# Patient Record
Sex: FEMALE | Race: WHITE | NOT HISPANIC OR LATINO | URBAN - METROPOLITAN AREA
[De-identification: names, ages, dates, MRNs, and addresses within clinical notes are randomized per-mention and may not be internally consistent; named-entity substitution may affect disease eponyms.]

---
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
## 2022-03-13 NOTE — ED PROVIDER NOTE - BIRTH SEX
Called to check on patient.  Left message for her to call clinic with questions or concerns  
Female
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
## 2022-10-10 NOTE — ED PROCEDURE NOTE - CPROC ED COMPLICATIONS1
no

## 2022-10-12 ENCOUNTER — EMERGENCY (EMERGENCY)
Facility: HOSPITAL | Age: 22
LOS: 0 days | Discharge: HOME | End: 2022-10-12
Attending: PEDIATRICS | Admitting: PEDIATRICS

## 2022-10-12 VITALS
RESPIRATION RATE: 18 BRPM | TEMPERATURE: 98 F | DIASTOLIC BLOOD PRESSURE: 84 MMHG | WEIGHT: 293 LBS | OXYGEN SATURATION: 100 % | HEART RATE: 96 BPM | SYSTOLIC BLOOD PRESSURE: 142 MMHG

## 2022-10-12 DIAGNOSIS — T47.96XA: ICD-10-CM
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
## 2022-10-12 DIAGNOSIS — K92.1 MELENA: ICD-10-CM

## 2022-10-12 DIAGNOSIS — Z87.19 PERSONAL HISTORY OF OTHER DISEASES OF THE DIGESTIVE SYSTEM: ICD-10-CM

## 2022-10-12 DIAGNOSIS — Z91.14 PATIENT'S OTHER NONCOMPLIANCE WITH MEDICATION REGIMEN: ICD-10-CM
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
## 2022-10-12 PROCEDURE — 99282 EMERGENCY DEPT VISIT SF MDM: CPT

## 2022-10-12 NOTE — ED PROVIDER NOTE - NS ED ROS FT
Constitutional: (-) fever (-)chills  (-)sweats  Eyes/ENT: (-) blurry vision (-) epistaxis  (-)rhinorrhea  (-)sore throat  Cardiovascular: (-) chest pain, (-) palpitations   Respiratory: (-) cough, (-) shortness of breath  Gastrointestinal: (-) vomiting, (-) diarrhea  (-) abdominal pain (+) bloody stool  Musculoskeletal: (-) neck pain, (-) back pain, (-) joint pain  Integumentary: (-) rash, (-) edema  Neurological: (-) headache, (-) altered mental status  (-) LOC  Allergic/Immunologic: (-) pruritus

## 2022-10-12 NOTE — ED PROVIDER NOTE - NSFOLLOWUPINSTRUCTIONS_ED_ALL_ED_FT
Contact a health care provider if:  •You have a fever.  •Your diarrhea gets worse.  •You have new symptoms.  •You cannot keep fluids down.  •You feel light-headed or dizzy.  •You have a headache.  •You have muscle cramps.        Get help right away if:  •You have chest pain.  •You feel extremely weak or you faint.  •The blood in your diarrhea increases or turns a different color.  •You vomit and the vomit is bloody or looks black.  •You have persistent diarrhea.  •You have severe pain, cramping, or bloating in your abdomen.  •You have trouble breathing or you are breathing very quickly.  •Your heart is beating very quickly.  •Your skin feels cold and clammy.  •You feel confused.  •You have signs of dehydration, such as:  •Dark urine, very little urine, or no urine.  •Cracked lips.  •Dry mouth.  •Sunken eyes.  •Sleepiness.  •Weakness.

## 2022-10-12 NOTE — ED PEDIATRIC NURSE NOTE - OBJECTIVE STATEMENT
pt states had 1 emesi of blood in stool states stool is brown snake like texture. pt has hx of abnormal endoscorpy denies vomiting states "always has abdomen pain"

## 2022-10-12 NOTE — ED PROVIDER NOTE - ATTENDING CONTRIBUTION TO CARE
23 yo F presents with bloody stools x 1 episode. Reports the blood was bright red when wiping and in the toilet. Denies any difficulty with having a bowel movement. Has a hx of ulcers so was concerned. Has not follow up with GI and non compliant with her meds. No vomiting or abdominal pain. No cp or sob. No lightheadedness or dizziness. Able to tolerate PO. VS reviewed pt well appearing nad  obese female heent eomi perrl no conjunctival injection TM wnl pharynx no erythema or exudates no cervical LAD cvs rrr s1 s2 no murmurs lungs ctabl abd soft nt nd no guarding no HSM rectal no gross blood per rectum no fissures nontender exam  ext from x 4 skin no rash wwp cap refil <2 neuro exam grossly normal  A: Bloody stool P: Reassurance, supportive caree. Outpt GI follow up. Return precautions discussed.

## 2022-10-12 NOTE — ED PROVIDER NOTE - PHYSICAL EXAMINATION
PHYSICAL EXAM:  General:	                   In no acute distress  Respiratory:	Lungs CTA b/l. Effort even and unlabored.  CV:		RRR. Normal S1/S2. No murmurs  Abdomen:	Soft, non-distended. Bowel sounds present. Non-tender to palpation throughout  Skin:		No rash.  Extremities:	Warm and well perfused.   Neurologic:	Alert and oriented.

## 2022-10-12 NOTE — ED PEDIATRIC TRIAGE NOTE - BP NONINVASIVE SYSTOLIC (MM HG)
142 General Surgery Office Consultation / H & P 
 
CC: Abdominal pain History of Present Illness:  
  
Lucia Mas is a 29 y.o. female who presents with chronic abdominal pain. Patient reports that over the last few years she has had severe right upper quadrant pain. This pain is usually associated with fatty foods including pork, steak, and pizza. The pain can be sharp in nature with radiation to her back. Can be 8 out of 10. Eating worsens the pain. Associated nausea as well pain medication and laxation helps. She been seen by Dr. Karyn Thrasher with GI to identify the etiology of her pain. She underwent a HIDA scan last year showing a hyper contractile gallbladder. Ultrasound done with GI showed no stones apparently. He had an EGD showing gastritis but no ulcers per patient. Only on birth control. Patient reports a strong family history of all females on her side of the family having her gallbladder out. Also history of no stones with the relatives and also HIDA scan showing hypercontractile gallbladder. Patient here for consideration of lap dutch. Past Medical History:  
Diagnosis Date  Anxiety  Asthma  Depression Past Surgical History:  
Procedure Laterality Date  HX TONSILLECTOMY  HX WISDOM TEETH EXTRACTION Family History Problem Relation Age of Onset  Prostate Cancer Father Social History Socioeconomic History  Marital status:  Spouse name: Not on file  Number of children: Not on file  Years of education: Not on file  Highest education level: Not on file Tobacco Use  Smoking status: Never Smoker  Smokeless tobacco: Never Used Substance and Sexual Activity  Alcohol use: Yes Comment: socially  Drug use: No  
 Sexual activity: Yes  
  Partners: Male Birth control/protection: Pill Prior to Admission medications Medication Sig Start Date End Date Taking?  Authorizing Provider  
norethindrone-ethinyl estradiol (Junel 1/20, 21,) 1-20 mg-mcg tablet Take 1 Tab by mouth daily. Skips placebo pills 8/19/47  Yes Deshawn Palacios MD  
b complex vitamins tablet Take 1 Tab by mouth daily. Provider, Historical  
 
No Known Allergies Review of Systems: 
Constitutional: No fever or chills Neurologic: No headache Eyes: No scleral icterus or irritated eyes Nose: No nasal pain or drainage Mouth: No oral lesions or sore throat Cardiac: No palpations or chest pain Pulmonary: No cough or shortness or breath Gastrointestinal: Abdominal pain and nausea Genitourinary: No dysuria Musculoskeletal: No muscle or joint tenderness Skin: No rashes or lesions Psychiatric: No anxiety or depressed mood Physical Exam:  
 
Visit Vitals /83 Pulse 98 Temp 98.8 °F (37.1 °C) (Oral) Resp 16 Ht 5' 4\" (1.626 m) Wt 117 lb 6.4 oz (53.3 kg) SpO2 98% BMI 20.15 kg/m² General: No acute distress, conversant Eyes: PERRLA, no scleral icterus HENT: Normocephalic without oral lesions Neck: Trachea midline without LAD Cardiac: Normal pulse rate and rhythm Pulmonary: Symmetric chest rise with normal effort GI: Soft, NT, ND, no hernia, no splenomegaly Skin: Warm without rash Extremities: No edema or joint stiffness Psych: Appropriate mood and affect Assessment:  
 
60-year-old female with a history of abdominal pain with a HIDA scan showing hypercontractile gallbladder with a strong family history of the same issue that responded well to lap dutch. Plan:  
 
Definitely an atypical presentation. Her story and the strong family history makes me suspicious of a hypercontractile gallbladder syndrome like biliary colic. I will try to obtain the ultrasound from the GI office to ensure findings. I have ordered labs to be done today including a CBC and CMP. I will discuss the case with my partners regarding appropriate next steps.   Multiple case reports and articles regarding hypercontractile gallbladder responding well to lap dutch. This is usually indicated when the EF is greater than 80. Her EF is 81. I discussed with the patient that I believe removing her gallbladder would likely solve her complaints. No other etiology has been found. No food intolerance found on GI work-up. I discussed with the patient the risks and benefits of surgery which include bleeding, infection, bile duct injury, cystic duct leak, bowel injury, the risk of anesthesia which includes MI, stroke, death. The patient understands these risks and given her family history and how miserable she is, along with her HIDA scan, she would like to proceed with lap dutch in the near future. I have discussed the case with my , Dr. Adrianne Greenberg, who agrees that lap dutch is appropriate for this patient and likely will solve her problem as well. Total time involved with this patient's care was: 45 minutes. This involved reviewing patient record, talking with patient, and charting on patient. Signed By: Leanna Bean MD 
Bariatric and General Surgeon [address] [place] [address] [facility] April 9, 2021

## 2022-10-12 NOTE — ED PROVIDER NOTE - PATIENT PORTAL LINK FT
You can access the FollowMyHealth Patient Portal offered by [facility] by registering at the following website: [email or web address]. By joining [employer]’s FollowMyHealth portal, you will also be able to view your health information using other applications (apps) compatible with our system.

## 2022-10-12 NOTE — ED PROVIDER NOTE - CARE PROVIDER_API CALL
Tea Gee (MD)  Gastroenterology  [address] [place] [address]  Phone: [phone]  Fax: [phone]  Follow Up Time: Routine

## 2022-12-23 ENCOUNTER — EMERGENCY (EMERGENCY)
Facility: HOSPITAL | Age: 22
LOS: 0 days | Discharge: HOME | End: 2022-12-24
Attending: STUDENT IN AN ORGANIZED HEALTH CARE EDUCATION/TRAINING PROGRAM | Admitting: STUDENT IN AN ORGANIZED HEALTH CARE EDUCATION/TRAINING PROGRAM

## 2022-12-23 VITALS
SYSTOLIC BLOOD PRESSURE: 152 MMHG | TEMPERATURE: 100 F | RESPIRATION RATE: 22 BRPM | HEART RATE: 103 BPM | WEIGHT: 293 LBS | DIASTOLIC BLOOD PRESSURE: 66 MMHG | OXYGEN SATURATION: 96 %

## 2022-12-23 DIAGNOSIS — Z28.311 PARTIALLY VACCINATED FOR COVID-19: ICD-10-CM

## 2022-12-23 DIAGNOSIS — R50.9 FEVER, UNSPECIFIED: ICD-10-CM

## 2022-12-23 DIAGNOSIS — M79.18 MYALGIA, OTHER SITE: ICD-10-CM

## 2022-12-23 DIAGNOSIS — R11.2 NAUSEA WITH VOMITING, UNSPECIFIED: ICD-10-CM

## 2022-12-23 DIAGNOSIS — U07.1 COVID-19: ICD-10-CM

## 2022-12-23 PROCEDURE — 99285 EMERGENCY DEPT VISIT HI MDM: CPT

## 2022-12-23 RX ORDER — ONDANSETRON 8 MG/1
4 TABLET, FILM COATED ORAL ONCE
Refills: 0 | Status: COMPLETED | OUTPATIENT
Start: 2022-12-23 | End: 2022-12-23

## 2022-12-23 RX ORDER — SODIUM CHLORIDE 9 MG/ML
1000 INJECTION INTRAMUSCULAR; INTRAVENOUS; SUBCUTANEOUS ONCE
Refills: 0 | Status: COMPLETED | OUTPATIENT
Start: 2022-12-23 | End: 2022-12-23

## 2022-12-24 VITALS
RESPIRATION RATE: 20 BRPM | HEART RATE: 91 BPM | SYSTOLIC BLOOD PRESSURE: 147 MMHG | OXYGEN SATURATION: 96 % | TEMPERATURE: 100 F | DIASTOLIC BLOOD PRESSURE: 64 MMHG

## 2022-12-24 PROBLEM — K21.9 GASTRO-ESOPHAGEAL REFLUX DISEASE WITHOUT ESOPHAGITIS: Chronic | Status: ACTIVE | Noted: 2022-10-12

## 2022-12-24 LAB
ALBUMIN SERPL ELPH-MCNC: 4.2 G/DL — SIGNIFICANT CHANGE UP (ref 3.5–5.2)
ALP SERPL-CCNC: 85 U/L — SIGNIFICANT CHANGE UP (ref 30–115)
ALT FLD-CCNC: 16 U/L — SIGNIFICANT CHANGE UP (ref 14–37)
ANION GAP SERPL CALC-SCNC: 11 MMOL/L — SIGNIFICANT CHANGE UP (ref 7–14)
APPEARANCE UR: CLEAR — SIGNIFICANT CHANGE UP
AST SERPL-CCNC: 16 U/L — SIGNIFICANT CHANGE UP (ref 14–37)
BASOPHILS # BLD AUTO: 0.04 K/UL — SIGNIFICANT CHANGE UP (ref 0–0.2)
BASOPHILS NFR BLD AUTO: 0.4 % — SIGNIFICANT CHANGE UP (ref 0–1)
BILIRUB SERPL-MCNC: 0.2 MG/DL — SIGNIFICANT CHANGE UP (ref 0.2–1.2)
BILIRUB UR-MCNC: NEGATIVE — SIGNIFICANT CHANGE UP
BUN SERPL-MCNC: 14 MG/DL — SIGNIFICANT CHANGE UP (ref 10–20)
CALCIUM SERPL-MCNC: 9.2 MG/DL — SIGNIFICANT CHANGE UP (ref 8.4–10.5)
CHLORIDE SERPL-SCNC: 101 MMOL/L — SIGNIFICANT CHANGE UP (ref 98–110)
CO2 SERPL-SCNC: 22 MMOL/L — SIGNIFICANT CHANGE UP (ref 17–32)
COLOR SPEC: YELLOW — SIGNIFICANT CHANGE UP
CREAT SERPL-MCNC: 0.9 MG/DL — SIGNIFICANT CHANGE UP (ref 0.7–1.5)
DIFF PNL FLD: ABNORMAL
EGFR: 94 ML/MIN/1.73M2 — SIGNIFICANT CHANGE UP
EOSINOPHIL # BLD AUTO: 0.03 K/UL — SIGNIFICANT CHANGE UP (ref 0–0.7)
EOSINOPHIL NFR BLD AUTO: 0.3 % — SIGNIFICANT CHANGE UP (ref 0–8)
EPI CELLS # UR: ABNORMAL /HPF
FLUAV AG NPH QL: SIGNIFICANT CHANGE UP
FLUBV AG NPH QL: SIGNIFICANT CHANGE UP
GLUCOSE SERPL-MCNC: 105 MG/DL — HIGH (ref 70–99)
GLUCOSE UR QL: NEGATIVE MG/DL — SIGNIFICANT CHANGE UP
HCG SERPL QL: NEGATIVE — SIGNIFICANT CHANGE UP
HCT VFR BLD CALC: 40 % — SIGNIFICANT CHANGE UP (ref 37–47)
HGB BLD-MCNC: 12.8 G/DL — SIGNIFICANT CHANGE UP (ref 12–16)
IMM GRANULOCYTES NFR BLD AUTO: 0.9 % — HIGH (ref 0.1–0.3)
KETONES UR-MCNC: NEGATIVE — SIGNIFICANT CHANGE UP
LEUKOCYTE ESTERASE UR-ACNC: NEGATIVE — SIGNIFICANT CHANGE UP
LIDOCAIN IGE QN: 19 U/L — SIGNIFICANT CHANGE UP (ref 7–60)
LYMPHOCYTES # BLD AUTO: 0.63 K/UL — LOW (ref 1.2–3.4)
LYMPHOCYTES # BLD AUTO: 6.1 % — LOW (ref 20.5–51.1)
MCHC RBC-ENTMCNC: 24.1 PG — LOW (ref 27–31)
MCHC RBC-ENTMCNC: 32 G/DL — SIGNIFICANT CHANGE UP (ref 32–37)
MCV RBC AUTO: 75.3 FL — LOW (ref 81–99)
MONOCYTES # BLD AUTO: 0.9 K/UL — HIGH (ref 0.1–0.6)
MONOCYTES NFR BLD AUTO: 8.8 % — SIGNIFICANT CHANGE UP (ref 1.7–9.3)
NEUTROPHILS # BLD AUTO: 8.57 K/UL — HIGH (ref 1.4–6.5)
NEUTROPHILS NFR BLD AUTO: 83.5 % — HIGH (ref 42.2–75.2)
NITRITE UR-MCNC: NEGATIVE — SIGNIFICANT CHANGE UP
NRBC # BLD: 0 /100 WBCS — SIGNIFICANT CHANGE UP (ref 0–0)
PH UR: 6 — SIGNIFICANT CHANGE UP (ref 5–8)
PLATELET # BLD AUTO: 300 K/UL — SIGNIFICANT CHANGE UP (ref 130–400)
POTASSIUM SERPL-MCNC: 4.4 MMOL/L — SIGNIFICANT CHANGE UP (ref 3.5–5)
POTASSIUM SERPL-SCNC: 4.4 MMOL/L — SIGNIFICANT CHANGE UP (ref 3.5–5)
PROT SERPL-MCNC: 6.6 G/DL — SIGNIFICANT CHANGE UP (ref 6–8)
PROT UR-MCNC: 30 MG/DL
RBC # BLD: 5.31 M/UL — SIGNIFICANT CHANGE UP (ref 4.2–5.4)
RBC # FLD: 14 % — SIGNIFICANT CHANGE UP (ref 11.5–14.5)
RBC CASTS # UR COMP ASSIST: SIGNIFICANT CHANGE UP /HPF
RSV RNA NPH QL NAA+NON-PROBE: SIGNIFICANT CHANGE UP
SARS-COV-2 RNA SPEC QL NAA+PROBE: DETECTED
SODIUM SERPL-SCNC: 134 MMOL/L — LOW (ref 135–146)
SP GR SPEC: 1.02 — SIGNIFICANT CHANGE UP (ref 1.01–1.03)
UROBILINOGEN FLD QL: 0.2 MG/DL — SIGNIFICANT CHANGE UP
WBC # BLD: 10.26 K/UL — SIGNIFICANT CHANGE UP (ref 4.8–10.8)
WBC # FLD AUTO: 10.26 K/UL — SIGNIFICANT CHANGE UP (ref 4.8–10.8)
WBC UR QL: NEGATIVE — SIGNIFICANT CHANGE UP

## 2022-12-24 PROCEDURE — 71045 X-RAY EXAM CHEST 1 VIEW: CPT | Mod: 26

## 2022-12-24 RX ORDER — METOCLOPRAMIDE HCL 10 MG
10 TABLET ORAL ONCE
Refills: 0 | Status: COMPLETED | OUTPATIENT
Start: 2022-12-24 | End: 2022-12-24

## 2022-12-24 RX ADMIN — ONDANSETRON 4 MILLIGRAM(S): 8 TABLET, FILM COATED ORAL at 00:27

## 2022-12-24 RX ADMIN — Medication 104 MILLIGRAM(S): at 00:56

## 2022-12-24 RX ADMIN — SODIUM CHLORIDE 2000 MILLILITER(S): 9 INJECTION INTRAMUSCULAR; INTRAVENOUS; SUBCUTANEOUS at 00:27

## 2022-12-24 NOTE — ED ADULT NURSE NOTE - NSIMPLEMENTINTERV_GEN_ALL_ED
Implemented All Universal Safety Interventions:  [place] to call system. Call bell, personal items and telephone within reach. Instruct patient to call for assistance. Room bathroom lighting operational. Non-slip footwear when patient is off stretcher. Physically safe environment: no spills, clutter or unnecessary equipment. Stretcher in lowest position, wheels locked, appropriate side rails in place.

## 2022-12-24 NOTE — ED PROVIDER NOTE - OBJECTIVE STATEMENT
21 yo F no pmhx presenting to the ED for evaluation of fever, chills, bodyaches, nausea, vomiting, headache today. pt reports this am symptoms started and after work tonight had few episodes of NBNB vomiting. denies any abd pain, sob, chest pain

## 2022-12-24 NOTE — ED PROVIDER NOTE - NSCAREINITIATED _GEN_ER
Start taking enalapril in the morning-can increase to 10 mg (can take two 5 mg tabs until you run out of 5mg)  Will switch cholesterol medicine to atorvastatin 20 mg  Limit salt, alcohol,  Limit fatty/fried foods  F/u 1 month for nurse blood pressure visit  Get labs fasting  F/u 3 month   Reyes, Jenny(Attending)

## 2022-12-24 NOTE — ED PROVIDER NOTE - CLINICAL SUMMARY MEDICAL DECISION MAKING FREE TEXT BOX
Previously healthy 22-year-old female presents to the ER for fevers, chills, myalgias, nausea, vomiting and gradual onset diffuse headache today.  Headache described as similar to prior migraines.  No abdominal pain, flank pain, urinary symptoms, sputum production, lethargy.  Is vaccinated for COVID.  Vitals noted, triage note reviewed. Gen - NAD, Head - NCAT, Pharynx - clear, MMM, Heart - RRR, no m/g/r, Lungs - CTAB, no w/c/r, Abdomen - soft, NT, ND, Skin - No rash, Extremities - FROM, no edema, erythema, ecchymosis, brisk cap refill, Neuro - A&O x3, equal strength and sensation, non-focal exam Labs and imaging personally reviewed.  Chest x-ray clear, patient is COVID-positive.  She was given fluids, Reglan with interval improvement of symptoms.  Was updated regarding all results and was given supportive care, return precautions, follow-up instructions, and she verbalized understanding.  Well-appearing prior to discharge.

## 2022-12-24 NOTE — ED PROVIDER NOTE - PATIENT PORTAL LINK FT
You can access the FollowMyHealth Patient Portal offered by [facility] by registering at the following website: [email or web address]. By joining [employer]’s FollowMyHealth portal, you will also be able to view your health information using other applications (apps) compatible with our system.

## 2022-12-24 NOTE — ED PROVIDER NOTE - NSFOLLOWUPINSTRUCTIONS_ED_ALL_ED_FT
Please drink plenty of fluids and take Tylenol as needed for fever or headache..     Return to the ER if you have uncontrolled vomiting, or oxygen less than 90%

## 2022-12-24 NOTE — ED PROVIDER NOTE - NS ED ATTENDING STATEMENT MOD
This was a shared visit with the GARCIA. I reviewed and verified the documentation and independently performed the documented:

## 2022-12-24 NOTE — ED PROVIDER NOTE - NS ED ROS FT
Constitutional: (+) fever, chills.  Eyes:  No visual changes, eye pain, eye discharge.  ENMT:  No hearing changes, pain, discharge or infections. No neck pain, neck stiffness. No sore throat  Cardiac:  No chest pain, palpitations  Respiratory:  (+)cough. no SOB  GI:  (+) nausea, vomiting. no diarrhea, abdominal pain.  :  No dysuria, frequency or burning. No hematuria  MS:  No myalgia, back pain. No leg swelling.   Neuro:  No headache or lightheadedness  Skin:  No skin rash    Except as documented in the HPI,  all other systems are negative.

## 2022-12-25 LAB
CULTURE RESULTS: SIGNIFICANT CHANGE UP
SPECIMEN SOURCE: SIGNIFICANT CHANGE UP

## 2023-05-24 NOTE — ED PROVIDER NOTE - PHYSICAL EXAMINATION
I spoke to patient, she wanted to confirm she is taking warfarin correctly, she confirmed that she is taking warfarin maintenance dose as listed.    Patient also states that she did not get a phone call after her INR on 5/22/23, per epic, Stephie spoke to patient but patient does not recall and states she did not get a voicemail either.    Next INR is scheduled for 5/25/23.    Cristal Griffith RN  Anticoagulation Clinic       CONSTITUTIONAL: well-appearing, in NAD  SKIN: Warm dry, normal skin turgor  HEAD: NCAT  EYES: EOMI, PERRLA, no scleral icterus, conjunctiva pink  ENT: normal pharynx with no erythema or exudates  NECK: Supple; non tender. Full ROM.  CARD: RRR, no murmurs.  RESP: clear to ausculation b/l. No crackles or wheezing.  ABD: soft, non-tender, non-distended, no rebound or guarding. no CVA tenderness, no L flank tenderness  EXT: Full ROM, no bony tenderness, no pedal edema, no calf tenderness  NEURO: normal motor. normal sensory. Normal gait.  PSYCH: Cooperative, appropriate.

## 2023-12-07 NOTE — ED PEDIATRIC NURSE NOTE - DISCHARGE DATE/TIME
Detail Level: Detailed
Add 84897 Cpt? (Important Note: In 2017 The Use Of 51335 Is Being Tracked By Cms To Determine Future Global Period Reimbursement For Global Periods): yes
16-Sep-2019 22:13

## 2024-02-06 NOTE — ED PROVIDER NOTE - PROVIDER TOKENS
Subjective:      Patient ID: Pattie Zuniga is a 52 y.o. female.    Chief Complaint:  Vaginal Discharge (Brown discharge) and Dyspareunia      History of Present Illness  51 yo  presents to discuss brown discharge and pain during sex    Reports she's had pain during vaginal intercourse for a little while now. Since starting effexor feels like her sex drive has decreased   In the last month has noted an occasional brown discharge, does not notice any odor. Wipes after using the bathroom with a wet wipe  Had a hysterectomy in  for fibroids, denies any vaginal bleeding since then. Denies history of abnormal paps previously     GYN & OB History  No LMP recorded. Patient has had a hysterectomy.   Date of Last Pap: No result found    OB History    Para Term  AB Living   6 3 3   3 3   SAB IAB Ectopic Multiple Live Births           3      # Outcome Date GA Lbr Telly/2nd Weight Sex Delivery Anes PTL Lv   6 AB            5 AB            4 AB            3 Term      Vag-Spont  N NKECHI   2 Term      Vag-Spont  N NKECHI   1 Term      Vag-Spont  N NKECHI       Review of Systems  Review of Systems   Genitourinary:  Positive for dyspareunia and vaginal discharge.          Objective:     Physical Exam:   Constitutional: She appears well-developed and well-nourished.    HENT:   Head: Normocephalic and atraumatic.    Eyes: Conjunctivae and EOM are normal.      Pulmonary/Chest: Effort normal.        Abdominal: Soft.     Genitourinary:    Right adnexa and left adnexa normal.      Pelvic exam was performed with patient in the lithotomy position.   The external female genitalia was normal.   Genitalia hair distrobution normal .   Right adnexum displays no mass. Left adnexum displays no mass. There is tenderness in the vagina. No vaginal discharge or bleeding in the vagina. Vaginal atrophy noted. Cervix is absent.Uterus is absent.    Genitourinary Comments: Chaperone present for duration of exam              Musculoskeletal: Normal range of motion.       Neurological: She is alert.    Skin: Skin is warm and dry.    Psychiatric: She has a normal mood and affect.         Assessment:     1. Vaginal discharge    2. Vaginal dryness, menopausal         Plan:     1. Vaginal discharge  - C. trachomatis/N. gonorrhoeae by AMP DNA [facility]; Vagina  - Vaginosis Screen by DNA Probe    2. Vaginal dryness, menopausal  - Discussed use of vaginal moisturizers such as Replens, coconut oil, and lubricant use during sex  - estradioL (ESTRACE) 0.01 % (0.1 mg/gram) vaginal cream; Place 2 g vaginally once daily. For two weeks, then 1g vaginally daily for 2 weeks, then 1g vaginally two times per week.  Dispense: 42.5 g; Refill: 2      Follow up in about 3 months to assess response to treatment    Ricardo Levy III, MD  [facility] [place] [facility]   [address]  [place] [address]   [phone]      PROVIDER:[TOKEN:[[id number]],FOLLOWUP:[Routine]]

## 2024-08-16 NOTE — ED PROVIDER NOTE - CARE PLAN
Patient presents to ED from [facility] in [place] for Marquez Catheter insertion. Pt has chronic Marquez and facility unable to reinsert x multiple attempts. Patient is at pt's baseline. Pmhx of Parkinsons.  
Principal Discharge DX:	Nail avulsion, finger

## 2025-04-05 ENCOUNTER — EMERGENCY (EMERGENCY)
Facility: HOSPITAL | Age: 25
LOS: 0 days | Discharge: HOME | End: 2025-04-05
Attending: EMERGENCY MEDICINE
Payer: COMMERCIAL

## 2025-04-05 VITALS
OXYGEN SATURATION: 100 % | RESPIRATION RATE: 22 BRPM | WEIGHT: 274.03 LBS | HEIGHT: 67 IN | TEMPERATURE: 98 F | DIASTOLIC BLOOD PRESSURE: 72 MMHG | HEART RATE: 72 BPM | SYSTOLIC BLOOD PRESSURE: 129 MMHG
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
## 2025-04-05 DIAGNOSIS — S61.012A LACERATION WITHOUT FOREIGN BODY OF LEFT THUMB WITHOUT DAMAGE TO NAIL, INITIAL ENCOUNTER: ICD-10-CM

## 2025-04-05 PROCEDURE — 99284 EMERGENCY DEPT VISIT MOD MDM: CPT | Mod: 25

## 2025-04-05 PROCEDURE — 99282 EMERGENCY DEPT VISIT SF MDM: CPT | Mod: 25

## 2025-04-05 PROCEDURE — 12001 RPR S/N/AX/GEN/TRNK 2.5CM/<: CPT

## 2025-04-18 ENCOUNTER — EMERGENCY (EMERGENCY)
Facility: HOSPITAL | Age: 25
LOS: 0 days | Discharge: HOME | End: 2025-04-18
Attending: EMERGENCY MEDICINE
Payer: COMMERCIAL

## 2025-04-18 VITALS
TEMPERATURE: 98 F | RESPIRATION RATE: 17 BRPM | OXYGEN SATURATION: 100 % | HEART RATE: 87 BPM | SYSTOLIC BLOOD PRESSURE: 136 MMHG | WEIGHT: 240.08 LBS | HEIGHT: 67 IN | DIASTOLIC BLOOD PRESSURE: 82 MMHG

## 2025-04-18 DIAGNOSIS — S61.012D LACERATION WITHOUT FOREIGN BODY OF LEFT THUMB WITHOUT DAMAGE TO NAIL, SUBSEQUENT ENCOUNTER: ICD-10-CM

## 2025-04-18 PROCEDURE — 99212 OFFICE O/P EST SF 10 MIN: CPT

## 2025-04-18 PROCEDURE — 99283 EMERGENCY DEPT VISIT LOW MDM: CPT

## 2025-04-18 RX ORDER — CEPHALEXIN 250 MG/1
1 CAPSULE ORAL
Refills: 0
Start: 2025-04-18

## 2025-04-18 RX ORDER — CEFUROXIME SODIUM 1.5 G
1 VIAL (EA) INJECTION
Qty: 14 | Refills: 0
Start: 2025-04-18 | End: 2025-04-24

## 2025-04-18 NOTE — ED ADULT NURSE NOTE - NS ED NURSE LEVEL OF CONSCIOUSNESS AFFECT
Calm
89 y/o female w/ a PMHx of dementia, hip fx, pacemaker, and HTN presents to the ED from [place] s/p fall. Neuro alert called in triage. Pt hit head. Denies any pain. Pt is on ASA.

## 2025-04-18 NOTE — ED PROVIDER NOTE - CLINICAL SUMMARY MEDICAL DECISION MAKING FREE TEXT BOX
Patient here for suture removal.  Minimal erythema focally at one aspect of the wound.  Will empirically give antibiotics to prevent any potential early infection.  Strict return precautions discussed.  In my opinion, based on current evaluation and results, an acute medical or surgical emergency does not appear to be occurring at this time and I feel that the pt is stable for further outpatient work up and/or treatment.  Return precautions discussed.  No clinical signs of abscess at this time

## 2025-04-18 NOTE — ED ADULT NURSE NOTE - CAS DISCH BELONGINGS RETURNED
Notified Dr Kristy Pizarro and hospitalist Dr Ruchi Li of patient temp 100 5 and  this am  Rechecked temp 98 5  Per patient, he was "having" temps at prior hospital and would "cool down" after receiving fluids, etc  Will follow blood cultures per hospitalist orders  Not applicable

## 2025-04-18 NOTE — ED PROVIDER NOTE - PATIENT PORTAL LINK FT
You can access the FollowMyHealth Patient Portal offered by [facility] by registering at the following website: [email or web address]. By joining [employer]’s FollowMyHealth portal, you will also be able to view your health information using other applications (apps) compatible with our system.

## 2025-04-18 NOTE — ED PROVIDER NOTE - PHYSICAL EXAMINATION
On exam nontoxic, vital signs noted, left thumb with 4 sutures in place to the palmar side of the distal phalanx with no discharge.  Focal mild swelling adjacent to the nail with mild tenderness.  Minimal redness there.  No fluctuance or streaking.  Clean, dry and intact.  5 out of 5 strength in flexion extension at the DIP and sensation intact to light touch throughout the digit.

## 2025-04-18 NOTE — ED PROVIDER NOTE - OBJECTIVE STATEMENT
25-year-old female presents for suture removal.  4 sutures placed on left thumb on April 5.  No weakness, numbness or tingling.  Last 2 days has had some mild swelling to the ulnar aspect adjacent to the nail.  No fluctuance or discharge.  No fever chills or streaking.

## 2025-04-18 NOTE — ED PROVIDER NOTE - NSFOLLOWUPINSTRUCTIONS_ED_ALL_ED_FT
Keep area clean and dry. Please return to the emergency department if you have increased redness surrounding the wound, other discoloration, discharge from the wound, excessive bleeding or swelling, weakness, numbness, tingling, severe pain, fevers >100.4 or other symptoms that you find concerning.     Take the antibiotics for 1 week.     Follow up with your primary care doctor within 1 week.